# Patient Record
Sex: FEMALE | Race: WHITE
[De-identification: names, ages, dates, MRNs, and addresses within clinical notes are randomized per-mention and may not be internally consistent; named-entity substitution may affect disease eponyms.]

---

## 2017-02-09 NOTE — XMS REPORT
Continuity of Care Document

 Created on: 2015



MALLIKA HAWKINS

External Reference #: [id number]

: 1955

Sex: Female



Demographics







 Address  [address]  [address]

 

 Home Phone  [phone]

 

 Preferred Language  English

 

 Marital Status  Unknown

 

 Jainism Affiliation  Unknown

 

 Race  Unknown

 

 Ethnic Group  Unknown





Author







 Author  [facility]

 

 Organization  [facility]

 

 Address  Unknown

 

 Phone  Unavailable







Support







 Name  Relationship  Address  Phone

 

 BLANCA HOLMAN MD  Caregiver  [address], [address]

[place]  [address] [phone]

 

 ROMA VERNON MD  Caregiver  [address]

[place]  [address]  [phone]

 

 MARISSA HAWKINS  Next Of Kin  [address]  [address] [phone]







Care Team Providers







 Care Team Member Name  Role  Phone

 

 BLANCA HOLMAN MD  PCP  [phone]







Insurance Providers







 Payer Name  Policy Number  Subscriber Name  Relationship

 

 [employer]  [id number]  Mallika Hawkins  18 Self / Same As Patient







Advance Directives







 Directive  Response  Recorded Date/Time

 

 Advance Directives  No  07/02/15 7:07pm

 

 Organ Donor  No  07/02/15 7:07pm

 

 Resuscitation Status  Full Code  07/02/15 7:07pm







Problems

Medical Problems





 Problem  Onset Date  Status

 

 Concussion with no loss of consciousness  Unknown  Active

 

 Fall on same level from slipping  Unknown  Active

 

 abrasion of scalp  Unknown  Active

 

 contusion of scalp  Unknown  Active

 

 strain of neck/back muscles  Unknown  Active

 

 Sinusitis  Unknown  Active

 

 Cellulitis  Unknown  Active







Medications







 Medication  Dose  Route  Sig  Days/Qty  Instructions  Order Date  Discontinued 
Date  Status

 

 Aspirin                 09     Active

 

 Tramadol HCl  50 Mg  PO  FOUR TIMES DAILY  30 Qty  1-2 tabs qid prn pain  14  Discontinued

 

 Naproxen  1 Each  PO  TWICE A DAY  20 Qty     09  Discontinued

 

 [Glaucoma Drops]                 09  Discontinued

 

 [Synthroid]                 09     Active

 

 Levothyroxine Sodium  75 Mcg  PO  DAILY        14     Active

 

 Bimatoprost  2.5 Ml  OP  BEDTIME        14     Active

 

 Hydrocodone Bit/Acetaminophen  1 Tab  PO  EVERY 4HRS PRN PAIN  20 Qty          Active

 

 Cyclobenzaprine HCl (Flexeril)  1 Each  PO  Q8HR PRN PRN SPASMS  14 Qty          Active

 

 Levofloxacin  1 Each  PO  DAILY  10 Qty     14     Active

 

 Trimethoprim/Sulfamethoxazole  1 Ea  PO  TWICE A DAY  14 Qty     07/02/15     
Active







Social History







 Social History Problem  Response  Recorded Date/Time

 

 Alcohol Use  Rarely Uses  2015 7:07pm

 

 Recreational Drug Use  No  2015 7:07pm

 

 Recent Foreign Travel  No  2015 7:07pm

 

 Recent Infectious Disease Exposure  No  2015 7:07pm

 

 Hospitalization with Isolation  Denies  2015 7:07pm

 

 Smoking Status  Never a Smoker  2015 7:07pm









 Query  Response  Start Date  Stop Date

 

 Smoking Status  Never a Smoker      







Hospital Discharge Instructions

No hospital discharge instructions.



Plan of Care

No plan of care.



Functional Status

No functional status results.



Allergies, Adverse Reactions, Alerts







 Allergen  Type  Severity  Reaction  Status  Last Updated

 

 Cephalexin  Allergy  Mild     Active  09







Immunizations







 Name  Given  Type

 

 Tetanus Booster (TDap)  Unknown  Historical







Vital Signs

Acute Vital Signs





 Vital  Response  Date/Time

 

 Temperature (Fahrenheit)  99.4 degrees F (97.6 - 99.5)   

 

 Temperature (Calculated Celsius)  37.41597 degrees C (36.4 - 37.5)   

 

 Temperature Source  Temporal     

 

 Pulse Rate (adult)  86 bpm (60 - 90)   

 

 Respiratory Rate  18 bpm (12 - 24)   

 

 O2 Sat by Pulse Oximetry  97 % (88 - 100)   

 

 Blood Pressure  128/98 mm Hg   

 

 Pain      

 

    Pain Intensity  6     

 

 Height (Feet)  5 feet    

 

 Height (Inches)  6 inches    

 

 Height (Calculated Centimeters)  167.410928 cm    

 

 Weight (Pounds)  145 pounds    

 

 Weight (Calculated Kilograms)  65.021600 kilograms    

 

 Calculated BMI  23.40     







Results

Laboratory Results







 Test Name  Result  Units  Flags  Reference  Collection Date/Time  Result Date/
Time  Comments

 

 White Blood Count  9.0  10^3/uL     4.3-11.0  2015 7:10pm  2015 7:
41pm   

 

 Red Blood Count  4.35  10^6/uL     4.35-5.85  2015 7:10pm  2015 7:
41pm   

 

 Hemoglobin  13.9  G/DL     11.5-16.0  2015 7:10pm  2015 7:41pm   

 

 Hematocrit  41  %     35-52  2015 7:10pm  2015 7:41pm   

 

 Mean Corpuscular Volume  95  FL     80-99  2015 7:10pm  2015 7:
41pm   

 

 Mean Corpuscular Hemoglobin  32  PG     25-34  2015 7:10pm  2015 7:
41pm   

 

 Mean Corpuscular Hemoglobin Concent  34  G/DL     32-36  2015 7:10pm  07/
2015 7:41pm   

 

 Red Cell Distribution Width  12.4  %     10.0-14.5  2015 7:10pm  2015 7:41pm   

 

 Platelet Count  303  10^3/uL     130-400  2015 7:10pm  2015 7:41pm
   

 

 Mean Platelet Volume  9.4  FL     7.4-10.4  2015 7:10pm  2015 7:
41pm   

 

 Neutrophils (%) (Auto)  69  %     42-75  2015 7:10pm  2015 7:41pm 
  

 

 Lymphocytes (%) (Auto)  23  %     12-44  2015 7:10pm  2015 7:41pm 
  

 

 Monocytes (%) (Auto)  6  %     0-12  2015 7:10pm  2015 7:41pm   

 

 Eosinophils (%) (Auto)  2  %     0-10  2015 7:10pm  2015 7:41pm   

 

 Basophils (%) (Auto)  0  %     0-10  2015 7:10pm  2015 7:41pm   

 

 Neutrophils # (Auto)  6.1  X 10^3     1.8-7.8  2015 7:10pm  2015 7:
41pm   

 

 Lymphocytes # (Auto)  2.0  X 10^3     1.0-4.0  2015 7:10pm  2015 7:
41pm   

 

 Monocytes # (Auto)  0.6  X 10^3     0.0-1.0  2015 7:10pm  2015 7:
41pm   

 

 Eosinophils # (Auto)  0.2  10^3/uL     0.0-0.3  2015 7:10pm  2015 7
:41pm   

 

 Basophils # (Auto)  0.0  10^3/uL     0.0-0.1  2015 7:10pm  2015 7:
41pm   

 

 Prothrombin Time  12.6  SEC     12.2-14.7  2015 7:10pm  2015 7:
35pm   

 

 INR Comment  1.0        0.8-1.4  2015 7:10pm  2015 7:35pm         
              INTERPRETIVE DATA

SUGGESTED THERAPEUTIC RANGE FOR INR'S:

   VENOUS THROMBOSIS, PULMONARY EMBOLISM, OR PREVENTION OF

   SYSTEMIC EMBOLISM (EG. IN ATRIAL FIBRILLATION):

                     2.0  -  3.0

   MECHANICAL PROSTHETIC HEART VALVES:

                     2.5  -  3.5*

*NOTE:  INR'S UP TO 4.5 MAY BE NECESSARY IN SELECTED GROUPS 

        OF HIGH RISK PATIENTS.

[employer] CONSENSUS

CONFERENCE ON ANTITHROMBOTIC THERAPY (2000).

 

 Activated Partial Thromboplast Time  29  SEC     24-35  2015 7:10pm  07/
2015 7:35pm   

 

 D-Dimer  0.32  UG/ML     0.00-0.49  2015 7:10pm  2015 7:39pm   

 

 Sodium Level  142  MMOL/L     135-145  2015 7:10pm  2015 7:43pm   

 

 Potassium Level  3.9  MMOL/L     3.6-5.0  2015 7:10pm  2015 7:43pm
   

 

 Chloride Level  108  MMOL/L  H    2015 7:10pm  2015 7:43pm   

 

 Carbon Dioxide Level  24  MMOL/L     21-32  2015 7:10pm  2015 7:
43pm   

 

 Blood Urea Nitrogen  19  MG/DL  H  7-18  2015 7:10pm  2015 7:43pm 
  

 

 Creatinine  0.71  MG/DL     0.60-1.30  2015 7:10pm  2015 7:43pm   

 

 BUN/Creatinine Ratio  27           2015 7:10pm  2015 7:43pm   

 

 Estimat Glomerular Filtration Rate  > 60           2015 7:10pm  07/02/
2015 7:43pm                    GFR INTERPRETIVE DATA



         UNITS FOR ESTIMATED GFR (eGFR): mL/min/1.73 M2

         REFERENCE RANGE FOR ESTIMATED GFR (eGFR)

                                          eGFR

         NORMAL eGFR                       >60

         MODERATELY DECREASED eGFR          30-59

         SEVERLY DECREASED eGFR             15-29

         KIDNEY FAILURE                    <15 (OR DIALYSIS)

 

 Glucose Level  104  MG/DL       2015 7:10pm  2015 7:43pm   

 

 Calcium Level  9.2  MG/DL     8.5-10.1  2015 7:10pm  2015 7:43pm   

 

 Total Bilirubin  0.6  MG/DL     0.1-1.0  2015 7:10pm  2015 7:43pm 
  

 

 Alkaline Phosphatase  77  U/L       2015 7:10pm  2015 7:43pm
   

 

 Aspartate Amino Transf (AST/SGOT)  35  U/L  H  5-34  2015 7:10pm  2015 7:43pm   

 

 Alanine Aminotransferase (ALT/SGPT)  25  U/L     0-55  2015 7:10pm   7:43pm   

 

 Total Protein  7.6  G/DL     6.4-8.2  2015 7:10pm  2015 7:43pm   

 

 Albumin  4.4  G/DL     3.2-4.5  2015 7:10pm  2015 7:43pm   







Procedures

No known history of procedures.



Encounters







 Encounter  Location  Date/Time

 

 Departed Emergency Room  [facility]  07/02/15 6:58pm











 Recent Diagnosis

## 2017-03-10 ENCOUNTER — HOSPITAL ENCOUNTER (OUTPATIENT)
Dept: HOSPITAL 75 - REHAB | Age: 62
LOS: 18 days | Discharge: HOME | End: 2017-03-28
Attending: NURSE PRACTITIONER
Payer: COMMERCIAL

## 2017-03-10 DIAGNOSIS — Z48.89: Primary | ICD-10-CM

## 2017-04-19 ENCOUNTER — HOSPITAL ENCOUNTER (OUTPATIENT)
Dept: HOSPITAL 75 - PREOP | Age: 62
End: 2017-04-19
Attending: INTERNAL MEDICINE
Payer: COMMERCIAL

## 2017-04-19 VITALS — HEIGHT: 68.5 IN | WEIGHT: 137 LBS | BODY MASS INDEX: 20.52 KG/M2

## 2017-04-19 DIAGNOSIS — K62.5: ICD-10-CM

## 2017-04-19 DIAGNOSIS — Z01.818: Primary | ICD-10-CM

## 2017-04-21 ENCOUNTER — HOSPITAL ENCOUNTER (OUTPATIENT)
Dept: HOSPITAL 75 - ENDO | Age: 62
Discharge: HOME | End: 2017-04-21
Attending: INTERNAL MEDICINE
Payer: COMMERCIAL

## 2017-04-21 VITALS — SYSTOLIC BLOOD PRESSURE: 127 MMHG | DIASTOLIC BLOOD PRESSURE: 84 MMHG

## 2017-04-21 VITALS — SYSTOLIC BLOOD PRESSURE: 118 MMHG | DIASTOLIC BLOOD PRESSURE: 69 MMHG

## 2017-04-21 VITALS — HEIGHT: 68.5 IN | WEIGHT: 137 LBS | BODY MASS INDEX: 20.52 KG/M2

## 2017-04-21 VITALS — SYSTOLIC BLOOD PRESSURE: 96 MMHG | DIASTOLIC BLOOD PRESSURE: 65 MMHG

## 2017-04-21 DIAGNOSIS — Z12.11: Primary | ICD-10-CM

## 2017-04-21 DIAGNOSIS — Q27.33: ICD-10-CM

## 2017-04-21 RX ADMIN — FENTANYL CITRATE PRN MCG: 50 INJECTION, SOLUTION INTRAMUSCULAR; INTRAVENOUS at 08:10

## 2017-04-21 RX ADMIN — FENTANYL CITRATE PRN MCG: 50 INJECTION, SOLUTION INTRAMUSCULAR; INTRAVENOUS at 08:23

## 2017-04-21 NOTE — OPERATIVE REPORT
DATE OF SERVICE:  



PROCEDURE PERFORMED: 

Screening colonoscopy.



The patient was placed in the left lateral decubitus position.  Prior to

undergoing colonoscopy, a digital rectal examination was performed.  Anal

sphincter tone was normal and the perianal reflex is intact.  No abnormalities

are noted to digital inspection of the anal canal or distal rectal vault. 

Colonoscopy was inserted into the rectum and under direct  visualization  and

advanced to the cecum.  The cecum was identified by identification of the

ileocecal valve and the cecal strap.  Photographic documentation was obtained. 

Careful inspection was made as the colonoscope was withdrawn.  The quality of

the prep was good.



FINDINGS: 

No evidence for internal or external hemorrhoids and the rectum, sigmoid colon,

descending colon and transverse colon were unremarkable. Present in the distal

ascending colon were several arteriovenous malformations.  A photograph was

obtained.  There was no evidence for bleeding.  No evidence for neoplasia was

identified.  The remainder of the ascending colon and cecum was unremarkable.  



ASSESSMENT:   

No evidence for neoplasia was noted on today's evaluation.  No evidence for

diverticular disease was noted.  Several arteriovenous malformations were noted

in the distal ascending colon.  There was no evidence for blood in the colon or

evidence for bleeding from these lesions.  This was an otherwise normal

colonoscopy.  The patient was reassured by today's findings.





Job ID: [id number]

DocumentID: [id number]

Dictated Date:  04/21/2017 13:00:01

Transcription Date: 04/21/2017 15:03:18

Dictated By: BLANCA HOLMAN MD

[facility]

## 2017-04-21 NOTE — PRE-OP NOTE & CONSCIOUS SEDAT
Pre-Operative Progress Note


H&P Reviewed


The H&P was reviewed, patient examined and no changes noted.


Date H&P Reviewed:  Apr 21, 2017


Time H&P Reviewed:  08:05





Conscious Sedation Pre-Proced


ASA Class:  2











Airway Mallampati Classification: ([place] appropriate class) I.  II.  III,  IV


 


Lungs 


 


Heart 


 


 ASA score


 


 ASA 1: a normal healthy patient


 


 ASA 2:  a patient with a mild systemic disease (mid diabetes, controlled 

hypertension, obesity 


 


 ASA 3:  a patient with a severe systemic disease that limits activity  (angina

, COPD, prior Myocardial infarction)


 


 ASA 4:  a patient with an incapacitating disease that is a constant threat to 

life (CHF, renal failure)


 


 ASA 5:  a moribund patient not expected to survive 24 hrs.  (ruptured aneurysm)


 


 ASA 6:  a declared brain dead patient whose organs are being harvested.


 


 For emergent operations, add the letter E after the classification








Grade 2


Sedation Plan:  Analgesia, Amnesia, Plan communicated to team members, 

Discussed options with patient/fam, Discussed risks with patient/fam


Note


The patient is an appropriate candidate to undergo the planned procedure, 

sedation, and anesthesia.





The patient immediately re-assessed prior to indication.











BLANCA HOLMAN MD Apr 21, 2017 08:05

## 2017-04-21 NOTE — HISTORY AND PHYSICAL
DATE OF ADMISSION:   

2017

 

DICTATING PHYSICIAN:

Dr. Talley 

 

Mrs. Hawkins is a 61-year-old white female who is set-up for a

screening colonoscopy.  She does report intermittent bright red

blood per rectum and it usually follows the passage of a hard

stool. She reports intermittent constipation. She reports her

mother had some form of colon surgery.  She does not believe it

was colon cancer but she is not sure. Her mother  at age 73

of complications from coronary artery disease. Father  at the

age of 77 of complications of heart disease as well and had a

heart attack and coronary artery bypass grafting. She has

recuperated from recent right CMC arthroplasty per Dr. Chandler

which occurred on . She is pleased with the results and

reports no ongoing pain or swelling. She has gained a little bit

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 She

is only up 2 pounds by our office scales compared to 6 months

ago. Otherwise she reports that she feels well. 

 

PAST MEDICAL HISTORY:

Significant for:

1.   Pulmonary embolism following immobility and a left DVT in

 for which she received an IVC filter. She has been off of

anticoagulation therapy since that time with no recurrent

thromboembolic disease. 

2.   She has a history of reflux disease for which he underwent

Nissen fundoplication in .  

3.   She has history of open angle glaucoma. 

 

FAMILY HISTORY:

Stated in the HPI. 

 

SOCIAL HISTORY:

She works as a .  Reports no significant alcohol

intake.  As I recall, does have a past 20 pack-year smoking

history. I will need to ask her about the specifics on this to

make sure that this is an accurate statement. 

 

PAST SURGICAL HISTORY:

Significant for:

1.   Right CMC arthroplasty on 2016.  

2.   She had cataract surgery 2009.

3.   Nissen fundoplication in 2004. 

 

PHYSICAL EXAMINATION:  Reveals a normal weight white female,

appears to be in no acute distress. 

VITAL SIGNS:  Blood pressure was 146/84. 

CHEST: Clear. 

CV: Revealed a regular rate and rhythm without murmur, S3 or S4. 

ABDOMEN: Soft, supple without masses, organomegaly or tenderness.

 

EXTREMITIES: Reveal no cyanosis, clubbing, or edema. Scarring was

nearly undetectable about the right thumb base and wrist with

nearly normal range of motion.  OA changes with Heberden's nodes,

mild were noted of the hands with no inflammatory change. 

 

IMPRESSION:

1.   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

good results.  

2.   Rectal bleeding for which the patient has been set-up for

diagnostic colonoscopy. Prep instructions with the Handy prep kit

were given and questions were answered. She was scheduled for the

. 

3.   Elevated blood pressure, mild.  Discussed salt reduction as

there is a family history for hypertension and increasing fruits

and vegetables in her diet. 

 

I will see her back in 6 months for routine follow-up. 

 

 

 

Job ID: [id number] 

Dictated Date: 2017 07:43:00 

Transcription Date: 2017 12:28:44/lizandro

## 2017-07-06 ENCOUNTER — APPOINTMENT (RX ONLY)
Dept: URBAN - METROPOLITAN AREA CLINIC 51 | Facility: CLINIC | Age: 62
Setting detail: DERMATOLOGY
End: 2017-07-06

## 2017-07-06 DIAGNOSIS — L82.1 OTHER SEBORRHEIC KERATOSIS: ICD-10-CM

## 2017-07-06 DIAGNOSIS — L57.0 ACTINIC KERATOSIS: ICD-10-CM

## 2017-07-06 PROCEDURE — 99202 OFFICE O/P NEW SF 15 MIN: CPT

## 2017-07-06 PROCEDURE — ? COUNSELING

## 2017-07-06 PROCEDURE — ? TREATMENT REGIMEN

## 2017-07-06 ASSESSMENT — LOCATION SIMPLE DESCRIPTION DERM
LOCATION SIMPLE: RIGHT HAND
LOCATION SIMPLE: RIGHT FOREARM
LOCATION SIMPLE: LEFT FOREARM

## 2017-07-06 ASSESSMENT — LOCATION ZONE DERM
LOCATION ZONE: ARM
LOCATION ZONE: HAND

## 2017-07-06 ASSESSMENT — LOCATION DETAILED DESCRIPTION DERM
LOCATION DETAILED: RIGHT ULNAR DORSAL HAND
LOCATION DETAILED: LEFT PROXIMAL DORSAL FOREARM
LOCATION DETAILED: RIGHT PROXIMAL DORSAL FOREARM

## 2017-07-06 ASSESSMENT — PAIN INTENSITY VAS: HOW INTENSE IS YOUR PAIN 0 BEING NO PAIN, 10 BEING THE MOST SEVERE PAIN POSSIBLE?: NO PAIN

## 2019-12-26 ENCOUNTER — HOSPITAL ENCOUNTER (OUTPATIENT)
Dept: HOSPITAL 75 - RAD | Age: 64
End: 2019-12-26
Attending: OBSTETRICS & GYNECOLOGY
Payer: COMMERCIAL

## 2019-12-26 DIAGNOSIS — Z12.31: Primary | ICD-10-CM

## 2019-12-26 PROCEDURE — 77067 SCR MAMMO BI INCL CAD: CPT

## 2019-12-26 NOTE — DIAGNOSTIC IMAGING REPORT
INDICATION: Routine screening.



COMPARISON: Comparison is made with prior mammograms from

11/09/2017 and 08/30/2016.



TECHNIQUE: 2-D and 3-D bilateral screening mammography was

performed. The current study was also evaluated with a Computer

Aided Detection (CAD) system. 3-D tomosynthesis was also

performed and reviewed.



FINDINGS: Both breasts remain heterogeneously dense, limiting the

sensitivity of mammography. Biopsy clips are noted in both

breasts. Scattered benign calcifications are noted. No dominant

mass or malignant-appearing microcalcifications are seen. Axillae

are unremarkable.



IMPRESSION: No mammographic features suspicious for malignancy

are identified.



ACR BI-RADS Category 2: Benign findings.

Result letter will be mailed to the patient.

Note: At least 10% of breast cancer is not imaged by mammography.



Dictated by: 



  Dictated on workstation # [id number]

## 2021-03-22 ENCOUNTER — HOSPITAL ENCOUNTER (EMERGENCY)
Dept: HOSPITAL 75 - ER | Age: 66
Discharge: HOME | End: 2021-03-22
Payer: COMMERCIAL

## 2021-03-22 ENCOUNTER — HOSPITAL ENCOUNTER (OUTPATIENT)
Dept: HOSPITAL 75 - RAD | Age: 66
End: 2021-03-22
Attending: NURSE PRACTITIONER
Payer: COMMERCIAL

## 2021-03-22 VITALS — DIASTOLIC BLOOD PRESSURE: 91 MMHG | SYSTOLIC BLOOD PRESSURE: 127 MMHG

## 2021-03-22 VITALS — HEIGHT: 67.72 IN | BODY MASS INDEX: 22.51 KG/M2 | WEIGHT: 146.83 LBS

## 2021-03-22 DIAGNOSIS — Z53.9: Primary | ICD-10-CM

## 2021-03-22 DIAGNOSIS — Z79.890: ICD-10-CM

## 2021-03-22 DIAGNOSIS — Z88.1: ICD-10-CM

## 2021-03-22 DIAGNOSIS — Z86.718: ICD-10-CM

## 2021-03-22 DIAGNOSIS — J18.8: Primary | ICD-10-CM

## 2021-03-22 DIAGNOSIS — E03.9: ICD-10-CM

## 2021-03-22 DIAGNOSIS — Z86.711: ICD-10-CM

## 2021-03-22 LAB
BASOPHILS # BLD AUTO: 0.1 10^3/UL (ref 0–0.1)
BASOPHILS NFR BLD AUTO: 1 % (ref 0–10)
BUN/CREAT SERPL: 14
BUN/CREAT SERPL: 15
CALCIUM SERPL-MCNC: 9.4 MG/DL (ref 8.5–10.1)
CHLORIDE SERPL-SCNC: 107 MMOL/L (ref 98–107)
CK MB SERPL-MCNC: 4.4 NG/ML (ref ?–6.6)
CK SERPL-CCNC: 212 U/L (ref 29–168)
CO2 SERPL-SCNC: 21 MMOL/L (ref 21–32)
CREAT SERPL-MCNC: 0.74 MG/DL (ref 0.6–1.3)
CREAT SERPL-MCNC: 0.78 MG/DL (ref 0.6–1.3)
EOSINOPHIL # BLD AUTO: 0.4 10^3/UL (ref 0–0.3)
EOSINOPHIL NFR BLD AUTO: 4 % (ref 0–10)
EOSINOPHIL NFR BLD MANUAL: 4 %
GFR SERPLBLD BASED ON 1.73 SQ M-ARVRAT: > 60 ML/MIN
GFR SERPLBLD BASED ON 1.73 SQ M-ARVRAT: > 60 ML/MIN
GLUCOSE SERPL-MCNC: 96 MG/DL (ref 70–105)
HCT VFR BLD CALC: 43 % (ref 35–52)
HGB BLD-MCNC: 14.5 G/DL (ref 11.5–16)
LYMPHOCYTES # BLD AUTO: 2.6 10^3/UL (ref 1–4)
LYMPHOCYTES NFR BLD AUTO: 26 % (ref 12–44)
MCH RBC QN AUTO: 32 PG (ref 25–34)
MCHC RBC AUTO-ENTMCNC: 34 G/DL (ref 32–36)
MCV RBC AUTO: 95 FL (ref 80–99)
MONOCYTES # BLD AUTO: 0.8 10^3/UL (ref 0–1)
MONOCYTES NFR BLD AUTO: 8 % (ref 0–12)
MONOCYTES NFR BLD: 5 %
NEUTROPHILS # BLD AUTO: 6.3 10^3/UL (ref 1.8–7.8)
NEUTROPHILS NFR BLD AUTO: 62 % (ref 42–75)
NEUTS BAND NFR BLD MANUAL: 6 %
PLATELET # BLD: 335 10^3/UL (ref 130–400)
PMV BLD AUTO: 9.5 FL (ref 9–12.2)
POTASSIUM SERPL-SCNC: 3.6 MMOL/L (ref 3.6–5)
RBC MORPH BLD: NORMAL
SODIUM SERPL-SCNC: 141 MMOL/L (ref 135–145)
VARIANT LYMPHS NFR BLD MANUAL: 25 %
WBC # BLD AUTO: 10.2 10^3/UL (ref 4.3–11)

## 2021-03-22 PROCEDURE — 82565 ASSAY OF CREATININE: CPT

## 2021-03-22 PROCEDURE — 71275 CT ANGIOGRAPHY CHEST: CPT

## 2021-03-22 PROCEDURE — 36415 COLL VENOUS BLD VENIPUNCTURE: CPT

## 2021-03-22 PROCEDURE — 85027 COMPLETE CBC AUTOMATED: CPT

## 2021-03-22 PROCEDURE — 82553 CREATINE MB FRACTION: CPT

## 2021-03-22 PROCEDURE — 71045 X-RAY EXAM CHEST 1 VIEW: CPT

## 2021-03-22 PROCEDURE — 84484 ASSAY OF TROPONIN QUANT: CPT

## 2021-03-22 PROCEDURE — 93005 ELECTROCARDIOGRAM TRACING: CPT

## 2021-03-22 PROCEDURE — 84520 ASSAY OF UREA NITROGEN: CPT

## 2021-03-22 PROCEDURE — 87635 SARS-COV-2 COVID-19 AMP PRB: CPT

## 2021-03-22 PROCEDURE — 82550 ASSAY OF CK (CPK): CPT

## 2021-03-22 PROCEDURE — 85007 BL SMEAR W/DIFF WBC COUNT: CPT

## 2021-03-22 PROCEDURE — 80048 BASIC METABOLIC PNL TOTAL CA: CPT

## 2021-03-22 NOTE — DIAGNOSTIC IMAGING REPORT
INDICATION: Shortness air, upper anterior chest pressure.



FINDINGS: Portable view of the chest demonstrates some calcified

granulomas in the right upper lobe. Lungs are otherwise clear.

Heart size is upper normal with normal vascularity. There are no

pleural effusions.



IMPRESSION: There are no acute findings.



Dictated by: 



  Dictated on workstation # [id number]
PROCEDURE: CT angiography of the chest with contrast.



TECHNIQUE: Multiple contiguous axial images were obtained through

the chest after uneventful bolus administration of intravenous

contrast. 3D reconstructed CTA MIP acquisitions were also

performed.

Auto Exposure Controls were utilized during the CT exam to meet

ALARA standards for radiation dose reduction. 



 

INDICATION: Short of breath, dyspnea, history of pulmonary

emboli. Patient was given Benadryl and steroids prior to the

examination. Symptoms for 2 to 3 days. Previous [place] filter

placement.



COMPARISON STUDY: There are no pertinent studies.



FINDINGS: No pulmonary emboli are present. There is no evidence

of right heart dysfunction. The heart size is normal. No pleural

or pericardial effusions are present. No aortic aneurysm or

dissection is present. No significant vascular calcifications are

seen. There is a moderate sized hiatal hernia. No abnormal

adenopathy is present. Visualized portions of the abdomen

demonstrate an IVC filter in place. The lungs demonstrate

calcified granuloma in the right upper lobe. Lungs are otherwise

clear. No osseous lesions are present. There are diffuse

degenerative changes and scoliosis. No stenosis is evident. An

IVC filter is in place.



IMPRESSION:

1. No pulmonary embolism is present.

2. An IVC filter is in place.

3. There is a moderate sized hiatal hernia.



Dictated by: 



  Dictated on workstation # [id number]
No

## 2021-03-22 NOTE — ED RESPIRATORY
General


Chief Complaint:  Respiratory Problems


Stated Complaint:  SOB/HX OF PULMONARY EMBOLISM


Nursing Triage Note:  


PT PRESENTS TO ED WITH COMPLAINTS OF SOAX 2-3 DAYS. REPORTS SHE ALSO HAS SOME 


MEDIAL CHEST PRESSURE. PT REPORTS SHE WAS SEEN AT DR PARDO OFFICE AND SWABBED




FOR COVID WHICH WAS NEGATIVE.


Source:  patient, family


Exam Limitations:  no limitations


 (DOMENICA MAIER MD)





History of Present Illness


Date Seen by Provider:  Mar 22, 2021


Time Seen by Provider:  16:37


Initial Comments


Patient is a 65-year-old female who presents to the emergency department today 

with a chief complaint of shortness of breath and chest pressure.  Patient 

states that she is beginning progressively short of breath over the last several

weeks however, in the last 2 to 3 days it has been significant.  Patient states 

that she is fairly active and she is able to ride a exercise bike.  Patient 

states the last time she has been on it was 3 days ago.  Patient states that she

is conversationally short of breath and even walking down her driveway she gets 

very short of breath, walking up and down stairs.  Patient has a history of 

massive pulmonary embolism back in the 1980s.  She has had a history of a DVT in

her leg in the past as well.  Patient is on daily aspirin therapy.  She denies 

any fevers or chills, she denies productive cough.  Patient is endorsing some 

chest pressure/heaviness.  She currently rates it at about a "5".  It is 

nonradiating.  Patient states it seems to be worse with exertion.  Patient has a

family history of family members with heart disease specifically her parents who

both  in their 70s.  She also has a history of familial clotting disorder 

but she states she tested negative in the past.





Patient denies any recent fevers, chills, GI or  symptoms.  She is a non-

smoker.  Patient tells me that she has a Callao filter in place.





All other review of systems reviewed and negative except as stated.


Timing/Duration:  getting worse


Severity:  moderate


Modifying Factors:  Improves With Rest


Associated Symptoms:  wheezing (DOMENICA MAIER MD)





Allergies and Home Medications


Allergies


Coded Allergies:  


     cephalexin (Unverified  Allergy, Mild, 17)





Home Medications


Bimatoprost 2.5 Ml Drops, 2.5 ML OP HS, (Reported)


Levothyroxine Sodium 75 Mcg Tablet, 75 MCG PO DAILY, (Reported)





Patient Home Medication List


Home Medication List Reviewed:  Yes


 (DOMENICA MAIER MD)





Review of Systems


Review of Systems


Constitutional:  see HPI


EENTM:  no symptoms reported


Respiratory:  dyspnea on exertion, short of breath, wheezing


Cardiovascular:  chest pain (pressure)


Gastrointestinal:  no symptoms reported


Genitourinary:  no symptoms reported


Pregnant:  No


Musculoskeletal:  no symptoms reported


Skin:  no symptoms reported


Psychiatric/Neurological:  No Symptoms Reported (DOMENICA MAIER MD)





Past Medical-Social-Family Hx


Patient Social History


Alcohol Use:  Denies Use


Smoking Status:  Never a Smoker


Recent Infectious Disease Expo:  No


Recent Hopitalizations:  No


 (DOMENICA MAIER MD)





Immunizations Up To Date


Tetanus Booster (TDap):  Unknown


Date of Influenza Vaccine:  Oct 10, 2016


 (DOMENICA MAIER MD)





Seasonal Allergies


Seasonal Allergies:  Yes (MILD)


 (DOMENICA MAIER MD)





Past Medical History


Surgeries:  Yes (VENA CAVA FILTER, HIATAL HERNIA, CATARACTS, HAND SURGERY)


Respiratory:  Yes


Pulmonary Embolism


Cardiac:  No


Neurological:  Yes


Headaches /Migraines


Reproductive Disorders:  No


Sexually Transmitted Disease:  No


HIV/AIDS:  No


Gastrointestinal:  No


Chronic Constipation


Musculoskeletal:  Yes


Arthritis, Chronic Back Pain, Fractures


Endocrine:  Yes


Hypothyroidsim


Cataract, Glaucoma


Cancer:  No


Psychosocial:  No


Integumentary:  No


Blood Disorders:  Yes (BLOOD CLOTS)


Adverse Reaction/Blood Tranf:  No


 (DOMENICA MAIER MD)





Physical Exam





Vital Signs - First Documented








 3/22/21





 16:37


 


Pulse 70


 


Resp 18


 


B/P (MAP) 127/91 (103)


 


Pulse Ox 98





 (TAMARA,GREGORY J)


Capillary Refill : Less Than 3 Seconds 


 (DOMENICA MAIER MD)


Height: 5'8.50"


Weight: 137lbs. 0.0oz. [id number]; 22.00 BMI


Method:Stated


General Appearance:  WD/WN, no apparent distress


Eyes:  Bilateral Eye Normal Inspection, Bilateral Eye PERRL, Bilateral Eye EOMI


HEENT:  PERRL/EOMI


Neck:  full range of motion, supple, normal inspection


Respiratory:  no respiratory distress, no accessory muscle use, wheezing 

(Scattered expiratory wheezes noted on on the left no increased work of 

breathing.  Patient is very mildly conversationally dyspneic)


Cardiovascular:  regular rate, rhythm


Gastrointestinal:  non tender, soft


Extremities:  non-tender, normal inspection, no pedal edema, no calf tenderness,

normal capillary refill


Neurologic/Psychiatric:  alert, normal mood/affect, oriented x 3


Skin:  normal color, warm/dry (DOMENICA MAIER MD)





Progress/Results/Core Measures


Suspected Sepsis


Recent Fever Within 48 Hours:  No


Infection Criteria Present:  None


New/Unexplained  Altered Menta:  No


Sepsis Screen:  No Definite Risk


SIRS


Temperature: 


Pulse: 70 


Respiratory Rate: 18


 


Laboratory Tests


3/22/21 16:30: White Blood Count 10.2


Blood Pressure 127 /91 


Mean: 103


 











Laboratory Tests


3/22/21 16:30: 


Creatinine 0.74, Platelet Count 335


 (DOMENICA MAIER MD)





Results/Orders


Lab Results





Laboratory Tests








Test


 3/22/21


16:30 Range/Units


 


 


White Blood Count


 10.2 


 4.3-11.0


10^3/uL


 


Red Blood Count


 4.53 


 3.80-5.11


10^6/uL


 


Hemoglobin 14.5  11.5-16.0  g/dL


 


Hematocrit 43  35-52  %


 


Mean Corpuscular Volume 95  80-99  fL


 


Mean Corpuscular Hemoglobin 32  25-34  pg


 


Mean Corpuscular Hemoglobin


Concent 34 


 32-36  g/dL





 


Red Cell Distribution Width 12.2  10.0-14.5  %


 


Platelet Count


 335 


 130-400


10^3/uL


 


Mean Platelet Volume 9.5  9.0-12.2  fL


 


Immature Granulocyte % (Auto) 0   %


 


Neutrophils (%) (Auto) 62  42-75  %


 


Lymphocytes (%) (Auto) 26  12-44  %


 


Monocytes (%) (Auto) 8  0-12  %


 


Eosinophils (%) (Auto) 4  0-10  %


 


Basophils (%) (Auto) 1  0-10  %


 


Neutrophils # (Auto)


 6.3 


 1.8-7.8


10^3/uL


 


Lymphocytes # (Auto)


 2.6 


 1.0-4.0


10^3/uL


 


Monocytes # (Auto)


 0.8 


 0.0-1.0


10^3/uL


 


Eosinophils # (Auto)


 0.4 H


 0.0-0.3


10^3/uL


 


Basophils # (Auto)


 0.1 


 0.0-0.1


10^3/uL


 


Immature Granulocyte # (Auto)


 0.0 


 0.0-0.1


10^3/uL


 


Neutrophils % (Manual) 6   %


 


Lymphocytes % (Manual) 25   %


 


Monocytes % (Manual) 5   %


 


Eosinophils % (Manual) 4   %


 


Blood Morphology Comment NORMAL   


 


Sodium Level 141  135-145  MMOL/L


 


Potassium Level 3.6  3.6-5.0  MMOL/L


 


Chloride Level 107    MMOL/L


 


Carbon Dioxide Level 21  21-32  MMOL/L


 


Anion Gap 13  5-14  MMOL/L


 


Blood Urea Nitrogen 11  7-18  MG/DL


 


Creatinine


 0.74 


 0.60-1.30


MG/DL


 


Estimat Glomerular Filtration


Rate > 60 


  





 


BUN/Creatinine Ratio 15   


 


Glucose Level 96    MG/DL


 


Calcium Level 9.4  8.5-10.1  MG/DL


 


Total Creatine Kinase 212 H   U/L


 


Creatine Kinase MB 4.4  <6.6  NG/ML


 


Troponin I < 0.028  <0.028  NG/ML





 (GREGORY MCDONALD)


My Orders





Orders - GREGORY MCDONALD


Iohexol Injection (Omnipaque 350 Mg/Ml 1 (3/22/21 18:30)


Received Contrast (Hold Metformin- Contr (3/22/21 18:30)


Sodium Chloride Flush (Catheter Flush Sy (3/22/21 18:30)


Ns (Ivpb) (Sodium Chloride 0.9% Ivpb Bag (3/22/21 18:30)


 (GREGORY MCDONALD)


Medications Given in ED





Current Medications








 Medications  Dose


 Ordered  Sig/Basim


 Route  Start Time


 Stop Time Status Last Admin


Dose Admin


 


 Diphenhydramine


 HCl  25 mg  ONCE  ONCE


 IVP  3/22/21 18:30


 3/22/21 18:32 DC 3/22/21 18:38


25 MG


 


 Iohexol  100 ml  ONCE  ONCE


 IV  3/22/21 18:30


 3/22/21 18:31 DC 3/22/21 19:17


65 ML


 


 Methylprednisolone


 Sodium Succinate  80 mg  ONCE  ONCE


 IV  3/22/21 18:30


 3/22/21 18:32 DC 3/22/21 18:38


80 MG


 


 Sodium Chloride  10 ml  AS NEEDED  PRN


 IV  3/22/21 18:30


    3/22/21 19:17


10 ML


 


 Sodium Chloride  100 ml  ONCE  ONCE


 IV  3/22/21 18:30


 3/22/21 18:31 DC 3/22/21 19:17


70 ML





 (GREGORY MCDONALD)


Vital Signs/I&O











 3/22/21





 16:37


 


Pulse 70


 


Resp 18


 


B/P (MAP) 127/91 (103)


 


Pulse Ox 98





 (GREGORY MCDONALD)


Vital Signs/I&O


Capillary Refill : Less Than 3 Seconds 


 (DOMENICA MAIER MD)








Blood Pressure Mean:                    103








Progress Note :  


Progress Note


Assumed care of the patient at shift change from Dr. Maier.  I agree with 

the above documented history and physical exam.  The patient is having some 

chest pressure and shortness of air with a strong history of pulmonary 

ballismus, DVT and a family predisposition for pulmonary embolisms.  She has a 

Urmila filter in place.  Her troponin and EKG are unremarkable.  She is soon

to go to CT for an angiogram of her chest.


 (GREGORY MCDONALD)


ECG


Initial ECG Impression Date:  Mar 22, 2021


Initial ECG Impression Time:  17:36


Initial ECG Rate:  63


Initial ECG Rhythm:  Normal Sinus


Initial ECG Intervals:  Normal


Initial ECG Impression:  Normal


 (DOMENICA MAIER MD)





Diagnostic Imaging





   Diagonstic Imaging:  CT


   Plain Films/CT/US/NM/MRI:  chest


Comments


NAME:   MALLIKA RUIZ


MED REC#:   [id number]


ACCOUNT#:   [id number]


PT STATUS:   REG ER


:   1955


PHYSICIAN:   DOMENICA MAIER MD


ADMIT DATE:   21/ER


                                  ***Signed***


Date of Exam:21





CT ANGIO CHEST W








PROCEDURE: CT angiography of the chest with contrast.





TECHNIQUE: Multiple contiguous axial images were obtained through


the chest after uneventful bolus administration of intravenous


contrast. 3D reconstructed CTA MIP acquisitions were also


performed.


Auto Exposure Controls were utilized during the CT exam to meet


ALARA standards for radiation dose reduction. 





 


INDICATION: Short of breath, dyspnea, history of pulmonary


emboli. Patient was given Benadryl and steroids prior to the


examination. Symptoms for 2 to 3 days. Previous Callao filter


placement.





COMPARISON STUDY: There are no pertinent studies.





FINDINGS: No pulmonary emboli are present. There is no evidence


of right heart dysfunction. The heart size is normal. No pleural


or pericardial effusions are present. No aortic aneurysm or


dissection is present. No significant vascular calcifications are


seen. There is a moderate sized hiatal hernia. No abnormal


adenopathy is present. Visualized portions of the abdomen


demonstrate an IVC filter in place. The lungs demonstrate


calcified granuloma in the right upper lobe. Lungs are otherwise


clear. No osseous lesions are present. There are diffuse


degenerative changes and scoliosis. No stenosis is evident. An


IVC filter is in place.





IMPRESSION:


1. No pulmonary embolism is present.


2. An IVC filter is in place.


3. There is a moderate sized hiatal hernia.





Dictated by: 





  Dictated on workstation # [id number]








Dict:   21


Trans:   21 1950


[facility] [id number]





Interpreted by:     SCOTTY MENESES MD


Electronically signed by: SCOTTY MENESES MD 21


   Reviewed:  Reviewed by Me


 (GREGORY MCDONALD)





Departure


Impression





   Primary Impression:  


   Walking pneumonia


Disposition:   HOME, SELF-CARE


Condition:  Stable





Departure-Patient Inst.


Decision time for Depature:  19:58


 (GREGORY MCDONALD)


Referrals:  


BLANCA HOLMAN MD (PCP/Family)


Primary Care Physician


Patient Instructions:  Atypical Pneumonia (Mycoplasma and Viral) (DC)





Add. Discharge Instructions:  


While I do not see any evidence of a large bacterial pneumonia on your imaging I

suspect you may have an atypical pneumonia most often caused by viruses or other

certain bacteria.  We will put you on some azithromycin as this may help.


2 tablets of azithromycin followed by 1 tablet daily until it is gone for a 

total of 5 days.


Albuterol inhaler 2 puffs every 4 hours as necessary for wheezing, coughing or 

tightness in your chest.


Vapor rubs such as Vicks or Mentholatum.


Keep your follow-up appointment on Thursday with your primary care doctor for 

reevaluation.


Summary we will call you in the next 2 days with the results of your Covid 

testing.


Return to the nearest ER promptly if you experience worsening shortness of air, 

chest pain or other worrisome symptoms despite the above interventions.


All discharge instructions reviewed with patient and/or family. Voiced 

understanding.


Scripts


Azithromycin (Azithromycin) 250 Mg Tablet


250 MG PO UD, #6 TAB


   TAKE 2 TABLETS ON DAY ONE THEN TAKE 1 TABLET DAILY FOR FOUR MORE DAYS


   Prov: GREGORY MCDONALD         3/22/21


Work/School Note:  Work Release Form   Date Seen in the Emergency Department:  

Mar 22, 2021


   Return to Work:  Mar 25, 2021


   Restrictions:  No Restrictions


   Other Restrictions Listed Below:  If Covid positive off isolation after 10 

days and 72 hours symptom-free.


   Restrictions:  Off quarantine when 72 hour symptom-free without medications 

to mask fever.





Copy


Copies To 1:   BLANCA HOLMAN MD, KATHRYN M MD         Mar 22, 2021 16:51


GREGORY MCDONALD                 Mar 22, 2021 18:17

## 2022-01-03 ENCOUNTER — HOSPITAL ENCOUNTER (OUTPATIENT)
Dept: HOSPITAL 75 - RAD | Age: 67
End: 2022-01-03
Attending: INTERNAL MEDICINE
Payer: COMMERCIAL

## 2022-01-03 DIAGNOSIS — R09.89: Primary | ICD-10-CM

## 2022-01-03 PROCEDURE — 93880 EXTRACRANIAL BILAT STUDY: CPT

## 2022-01-03 NOTE — DIAGNOSTIC IMAGING REPORT
PROCEDURE: 

US carotid duplex bilateral.



TECHNIQUE: 

Multiple Real-time grayscale images were obtained over the

carotid arteries in various projections bilaterally. Additional

spectral analysis and color Doppler duplex images were also

obtained.



INDICATION: 

Right carotid bruit.



FINDINGS:

There is minimal plaque in both carotid systems. The velocities

are normal bilaterally. No velocity elevation or stenosis is

identified. Both vertebral arteries show antegrade flow.



IMPRESSION: 

No evidence of a hemodynamically significant stenosis.



Parameters based on the consensus panel Gray-Scale and Doppler

ultrasound criteria published 

November 2003, Radiology, Volume 229. 



DOPPLER (peak systolic velocity M/S 

    

                Right    Left



CCA              .91     .85



ICA Proximal      .68     .64



ICA Mid           .83     .69

 

ICA Distal        .71     .65



RATIO            .91     .81



ECA              .78     .94



VERT              .38     .49



Dictated by: 



  Dictated on workstation # [id number]

## 2022-09-21 ENCOUNTER — APPOINTMENT (RX ONLY)
Dept: URBAN - METROPOLITAN AREA CLINIC 51 | Facility: CLINIC | Age: 67
Setting detail: DERMATOLOGY
End: 2022-09-21

## 2022-09-21 DIAGNOSIS — L81.5 LEUKODERMA, NOT ELSEWHERE CLASSIFIED: ICD-10-CM | Status: STABLE

## 2022-09-21 DIAGNOSIS — L56.5 DISSEMINATED SUPERFICIAL ACTINIC POROKERATOSIS (DSAP): ICD-10-CM | Status: STABLE

## 2022-09-21 DIAGNOSIS — L82.0 INFLAMED SEBORRHEIC KERATOSIS: ICD-10-CM | Status: WORSENING

## 2022-09-21 DIAGNOSIS — L82.1 OTHER SEBORRHEIC KERATOSIS: ICD-10-CM | Status: STABLE

## 2022-09-21 DIAGNOSIS — D22 MELANOCYTIC NEVI: ICD-10-CM | Status: STABLE

## 2022-09-21 DIAGNOSIS — L81.4 OTHER MELANIN HYPERPIGMENTATION: ICD-10-CM | Status: STABLE

## 2022-09-21 DIAGNOSIS — D18.0 HEMANGIOMA: ICD-10-CM | Status: STABLE

## 2022-09-21 PROBLEM — D18.01 HEMANGIOMA OF SKIN AND SUBCUTANEOUS TISSUE: Status: ACTIVE | Noted: 2022-09-21

## 2022-09-21 PROBLEM — D22.5 MELANOCYTIC NEVI OF TRUNK: Status: ACTIVE | Noted: 2022-09-21

## 2022-09-21 PROCEDURE — 99203 OFFICE O/P NEW LOW 30 MIN: CPT | Mod: 25

## 2022-09-21 PROCEDURE — ? COUNSELING

## 2022-09-21 PROCEDURE — 17110 DESTRUCTION B9 LES UP TO 14: CPT

## 2022-09-21 PROCEDURE — ? LIQUID NITROGEN

## 2022-09-21 ASSESSMENT — LOCATION SIMPLE DESCRIPTION DERM
LOCATION SIMPLE: RIGHT LOWER BACK
LOCATION SIMPLE: LEFT POSTERIOR UPPER ARM
LOCATION SIMPLE: UPPER BACK
LOCATION SIMPLE: LEFT UPPER BACK
LOCATION SIMPLE: RIGHT PRETIBIAL REGION
LOCATION SIMPLE: RIGHT HAND
LOCATION SIMPLE: RIGHT POSTERIOR UPPER ARM
LOCATION SIMPLE: LEFT PRETIBIAL REGION
LOCATION SIMPLE: LEFT ANTERIOR NECK

## 2022-09-21 ASSESSMENT — LOCATION DETAILED DESCRIPTION DERM
LOCATION DETAILED: LEFT DISTAL PRETIBIAL REGION
LOCATION DETAILED: RIGHT DISTAL PRETIBIAL REGION
LOCATION DETAILED: RIGHT INFERIOR MEDIAL MIDBACK
LOCATION DETAILED: RIGHT DISTAL POSTERIOR UPPER ARM
LOCATION DETAILED: LEFT SUPERIOR MEDIAL UPPER BACK
LOCATION DETAILED: LEFT CLAVICULAR NECK
LOCATION DETAILED: RIGHT RADIAL DORSAL HAND
LOCATION DETAILED: INFERIOR THORACIC SPINE
LOCATION DETAILED: LEFT DISTAL POSTERIOR UPPER ARM

## 2022-09-21 ASSESSMENT — LOCATION ZONE DERM
LOCATION ZONE: LEG
LOCATION ZONE: HAND
LOCATION ZONE: TRUNK
LOCATION ZONE: ARM
LOCATION ZONE: NECK

## 2022-09-21 ASSESSMENT — PAIN INTENSITY VAS: HOW INTENSE IS YOUR PAIN 0 BEING NO PAIN, 10 BEING THE MOST SEVERE PAIN POSSIBLE?: NO PAIN

## 2022-09-21 ASSESSMENT — SEVERITY ASSESSMENT: SEVERITY: MILD

## 2022-09-21 NOTE — PROCEDURE: LIQUID NITROGEN
Pared With?: curette
Medical Necessity Information: It is in your best interest to select a reason for this procedure from the list below. All of these items fulfill various CMS LCD requirements except the new and changing color options.
Show Topical Anesthesia Variable?: Yes
Spray Paint Text: The liquid nitrogen was applied to the skin utilizing a spray paint frosting technique.
Post-Care Instructions: I reviewed with the patient in detail post-care instructions. Patient is to wear sunprotection, and avoid picking at any of the treated lesions. Pt may apply Vaseline to crusted or scabbing areas.
Spray Paint Technique: No
Detail Level: Detailed
Number Of Freeze-Thaw Cycles: 2 freeze-thaw cycles
Duration Of Freeze Thaw-Cycle (Seconds): 5-10
Consent: The patient's consent was obtained including but not limited to risks of crusting, scabbing, blistering, scarring, darker or lighter pigmentary change, recurrence, incomplete removal and infection.

## 2023-09-21 ENCOUNTER — APPOINTMENT (RX ONLY)
Dept: URBAN - METROPOLITAN AREA CLINIC 51 | Facility: CLINIC | Age: 68
Setting detail: DERMATOLOGY
End: 2023-09-21

## 2023-09-21 DIAGNOSIS — L29.8 OTHER PRURITUS: ICD-10-CM | Status: INADEQUATELY CONTROLLED

## 2023-09-21 DIAGNOSIS — D18.0 HEMANGIOMA: ICD-10-CM | Status: STABLE

## 2023-09-21 DIAGNOSIS — L82.1 OTHER SEBORRHEIC KERATOSIS: ICD-10-CM | Status: STABLE

## 2023-09-21 DIAGNOSIS — L29.89 OTHER PRURITUS: ICD-10-CM | Status: INADEQUATELY CONTROLLED

## 2023-09-21 DIAGNOSIS — L57.0 ACTINIC KERATOSIS: ICD-10-CM | Status: WORSENING

## 2023-09-21 PROBLEM — D18.01 HEMANGIOMA OF SKIN AND SUBCUTANEOUS TISSUE: Status: ACTIVE | Noted: 2023-09-21

## 2023-09-21 PROCEDURE — ? LIQUID NITROGEN

## 2023-09-21 PROCEDURE — 99213 OFFICE O/P EST LOW 20 MIN: CPT | Mod: 25

## 2023-09-21 PROCEDURE — ? COUNSELING

## 2023-09-21 PROCEDURE — 17000 DESTRUCT PREMALG LESION: CPT

## 2023-09-21 PROCEDURE — ? PRESCRIPTION MEDICATION MANAGEMENT

## 2023-09-21 ASSESSMENT — LOCATION ZONE DERM
LOCATION ZONE: ARM
LOCATION ZONE: LEG
LOCATION ZONE: NOSE
LOCATION ZONE: TRUNK

## 2023-09-21 ASSESSMENT — LOCATION DETAILED DESCRIPTION DERM
LOCATION DETAILED: RIGHT LATERAL DISTAL PRETIBIAL REGION
LOCATION DETAILED: LEFT ANTERIOR DISTAL UPPER ARM
LOCATION DETAILED: LEFT SUPERIOR MEDIAL MIDBACK
LOCATION DETAILED: RIGHT ANTERIOR DISTAL UPPER ARM
LOCATION DETAILED: RIGHT SUPERIOR UPPER BACK
LOCATION DETAILED: RIGHT DISTAL PRETIBIAL REGION
LOCATION DETAILED: MIDDLE STERNUM
LOCATION DETAILED: LEFT NASAL SIDEWALL
LOCATION DETAILED: LEFT DISTAL PRETIBIAL REGION

## 2023-09-21 ASSESSMENT — LOCATION SIMPLE DESCRIPTION DERM
LOCATION SIMPLE: LEFT UPPER ARM
LOCATION SIMPLE: CHEST
LOCATION SIMPLE: LEFT NOSE
LOCATION SIMPLE: RIGHT UPPER BACK
LOCATION SIMPLE: RIGHT UPPER ARM
LOCATION SIMPLE: LEFT LOWER BACK
LOCATION SIMPLE: LEFT PRETIBIAL REGION
LOCATION SIMPLE: RIGHT PRETIBIAL REGION

## 2023-09-21 NOTE — PROCEDURE: PRESCRIPTION MEDICATION MANAGEMENT
Render In Strict Bullet Format?: No
Initiate Treatment: OTC CeraVe anti itch lotion daily
Detail Level: Simple

## 2023-09-21 NOTE — PROCEDURE: LIQUID NITROGEN
Show Applicator Variable?: Yes
Detail Level: Simple
Duration Of Freeze Thaw-Cycle (Seconds): 5
Number Of Freeze-Thaw Cycles: 2 freeze-thaw cycles
Post-Care Instructions: I reviewed with the patient in detail post-care instructions. Patient is to wear sunprotection, and avoid picking at any of the treated lesions. Pt may shower as normal, and apply Vaseline to crusted or scabbing areas.
Render Note In Bullet Format When Appropriate: No
Consent: The patient's verbal consent was obtained including but not limited to risks of crusting, scabbing, blistering, scarring, darker or lighter pigmentary change, recurrence, incomplete removal and infection.

## 2023-09-21 NOTE — HPI: SKIN LESION
Is This A New Presentation, Or A Follow-Up?: Skin Lesion
Has Your Skin Lesion Been Treated?: not been treated
Additional History: Yearly check
How Severe Is Your Skin Lesion?: mild

## 2023-11-10 ENCOUNTER — APPOINTMENT (RX ONLY)
Dept: URBAN - METROPOLITAN AREA CLINIC 51 | Facility: CLINIC | Age: 68
Setting detail: DERMATOLOGY
End: 2023-11-10

## 2023-11-10 DIAGNOSIS — L30.0 NUMMULAR DERMATITIS: ICD-10-CM | Status: WORSENING

## 2023-11-10 PROCEDURE — ? PRESCRIPTION

## 2023-11-10 PROCEDURE — 99213 OFFICE O/P EST LOW 20 MIN: CPT

## 2023-11-10 PROCEDURE — ? COUNSELING

## 2023-11-10 RX ORDER — TRIAMCINOLONE ACETONIDE 1 MG/G
CREAM TOPICAL BID
Qty: 80 | Refills: 2 | Status: ERX | COMMUNITY
Start: 2023-11-10

## 2023-11-10 RX ADMIN — TRIAMCINOLONE ACETONIDE: 1 CREAM TOPICAL at 00:00

## 2023-11-10 ASSESSMENT — LOCATION ZONE DERM: LOCATION ZONE: LEG

## 2023-11-10 ASSESSMENT — ITCH NUMERIC RATING SCALE: HOW SEVERE IS YOUR ITCHING?: 0

## 2023-11-10 ASSESSMENT — LOCATION DETAILED DESCRIPTION DERM: LOCATION DETAILED: LEFT ANTERIOR DISTAL THIGH

## 2023-11-10 ASSESSMENT — LOCATION SIMPLE DESCRIPTION DERM: LOCATION SIMPLE: LEFT THIGH

## 2023-11-10 ASSESSMENT — SEVERITY ASSESSMENT: SEVERITY: MILD

## 2023-11-10 ASSESSMENT — BSA RASH: BSA RASH: 1

## 2024-09-23 ENCOUNTER — APPOINTMENT (RX ONLY)
Dept: URBAN - METROPOLITAN AREA CLINIC 51 | Facility: CLINIC | Age: 69
Setting detail: DERMATOLOGY
End: 2024-09-23

## 2024-09-23 DIAGNOSIS — L82.1 OTHER SEBORRHEIC KERATOSIS: ICD-10-CM | Status: STABLE

## 2024-09-23 DIAGNOSIS — T07XXXA INSECT BITE, NONVENOMOUS, OF OTHER, MULTIPLE, AND UNSPECIFIED SITES, WITHOUT MENTION OF INFECTION: ICD-10-CM | Status: STABLE

## 2024-09-23 DIAGNOSIS — L81.4 OTHER MELANIN HYPERPIGMENTATION: ICD-10-CM | Status: STABLE

## 2024-09-23 DIAGNOSIS — D22 MELANOCYTIC NEVI: ICD-10-CM | Status: STABLE

## 2024-09-23 PROBLEM — S20.461A INSECT BITE (NONVENOMOUS) OF RIGHT BACK WALL OF THORAX, INITIAL ENCOUNTER: Status: ACTIVE | Noted: 2024-09-23

## 2024-09-23 PROBLEM — D22.71 MELANOCYTIC NEVI OF RIGHT LOWER LIMB, INCLUDING HIP: Status: ACTIVE | Noted: 2024-09-23

## 2024-09-23 PROBLEM — S20.462A INSECT BITE (NONVENOMOUS) OF LEFT BACK WALL OF THORAX, INITIAL ENCOUNTER: Status: ACTIVE | Noted: 2024-09-23

## 2024-09-23 PROCEDURE — ? SUNSCREEN RECOMMENDATIONS

## 2024-09-23 PROCEDURE — ? COUNSELING

## 2024-09-23 PROCEDURE — 99213 OFFICE O/P EST LOW 20 MIN: CPT

## 2024-09-23 PROCEDURE — ? PRESCRIPTION

## 2024-09-23 RX ORDER — TRIAMCINOLONE ACETONIDE 1 MG/G
THIN LAYER CREAM TOPICAL BID
Qty: 80 | Refills: 1 | Status: ERX

## 2024-09-23 ASSESSMENT — LOCATION DETAILED DESCRIPTION DERM
LOCATION DETAILED: RIGHT MID-UPPER BACK
LOCATION DETAILED: RIGHT INFERIOR UPPER BACK
LOCATION DETAILED: RIGHT ANTERIOR PROXIMAL UPPER ARM
LOCATION DETAILED: LEFT ANTERIOR PROXIMAL UPPER ARM
LOCATION DETAILED: LEFT SUPERIOR UPPER BACK
LOCATION DETAILED: RIGHT PROXIMAL PRETIBIAL REGION
LOCATION DETAILED: INFERIOR THORACIC SPINE
LOCATION DETAILED: RIGHT ANTERIOR PROXIMAL THIGH
LOCATION DETAILED: RIGHT DISTAL PRETIBIAL REGION
LOCATION DETAILED: RIGHT PROXIMAL POSTERIOR UPPER ARM
LOCATION DETAILED: LEFT PROXIMAL POSTERIOR UPPER ARM
LOCATION DETAILED: LEFT DISTAL PRETIBIAL REGION
LOCATION DETAILED: RIGHT LATERAL PROXIMAL PRETIBIAL REGION
LOCATION DETAILED: RIGHT PROXIMAL DORSAL FOREARM
LOCATION DETAILED: LEFT PROXIMAL PRETIBIAL REGION
LOCATION DETAILED: RIGHT VENTRAL DISTAL FOREARM
LOCATION DETAILED: RIGHT SUPERIOR PREAURICULAR CHEEK
LOCATION DETAILED: LEFT DISTAL DORSAL FOREARM
LOCATION DETAILED: LEFT SUPERIOR LATERAL MIDBACK
LOCATION DETAILED: LEFT VENTRAL PROXIMAL FOREARM

## 2024-09-23 ASSESSMENT — LOCATION SIMPLE DESCRIPTION DERM
LOCATION SIMPLE: LEFT UPPER BACK
LOCATION SIMPLE: UPPER BACK
LOCATION SIMPLE: LEFT PRETIBIAL REGION
LOCATION SIMPLE: LEFT UPPER ARM
LOCATION SIMPLE: RIGHT FOREARM
LOCATION SIMPLE: RIGHT UPPER ARM
LOCATION SIMPLE: LEFT POSTERIOR UPPER ARM
LOCATION SIMPLE: RIGHT PRETIBIAL REGION
LOCATION SIMPLE: RIGHT UPPER BACK
LOCATION SIMPLE: RIGHT CHEEK
LOCATION SIMPLE: LEFT LOWER BACK
LOCATION SIMPLE: RIGHT THIGH
LOCATION SIMPLE: LEFT FOREARM
LOCATION SIMPLE: RIGHT POSTERIOR UPPER ARM

## 2024-09-23 ASSESSMENT — LOCATION ZONE DERM
LOCATION ZONE: LEG
LOCATION ZONE: FACE
LOCATION ZONE: TRUNK
LOCATION ZONE: ARM

## 2024-09-23 ASSESSMENT — PAIN INTENSITY VAS: HOW INTENSE IS YOUR PAIN 0 BEING NO PAIN, 10 BEING THE MOST SEVERE PAIN POSSIBLE?: NO PAIN
